# Patient Record
Sex: MALE | Race: OTHER | HISPANIC OR LATINO
[De-identification: names, ages, dates, MRNs, and addresses within clinical notes are randomized per-mention and may not be internally consistent; named-entity substitution may affect disease eponyms.]

---

## 2024-09-13 ENCOUNTER — NON-APPOINTMENT (OUTPATIENT)
Age: 44
End: 2024-09-13

## 2024-09-13 PROBLEM — Z00.00 ENCOUNTER FOR PREVENTIVE HEALTH EXAMINATION: Status: ACTIVE | Noted: 2024-09-13

## 2024-09-17 ENCOUNTER — NON-APPOINTMENT (OUTPATIENT)
Age: 44
End: 2024-09-17

## 2024-09-18 ENCOUNTER — LABORATORY RESULT (OUTPATIENT)
Age: 44
End: 2024-09-18

## 2024-09-18 ENCOUNTER — NON-APPOINTMENT (OUTPATIENT)
Age: 44
End: 2024-09-18

## 2024-09-18 ENCOUNTER — APPOINTMENT (OUTPATIENT)
Dept: PEDIATRIC ALLERGY IMMUNOLOGY | Facility: CLINIC | Age: 44
End: 2024-09-18

## 2024-09-19 LAB
25(OH)D3 SERPL-MCNC: 36.8 NG/ML
ALBUMIN SERPL ELPH-MCNC: 4.6 G/DL
ALP BLD-CCNC: 46 U/L
ALT SERPL-CCNC: 26 U/L
ANION GAP SERPL CALC-SCNC: 10 MMOL/L
AST SERPL-CCNC: 27 U/L
BASOPHILS # BLD AUTO: 0.02 K/UL
BASOPHILS NFR BLD AUTO: 0.4 %
BILIRUB SERPL-MCNC: 0.4 MG/DL
BUN SERPL-MCNC: 18 MG/DL
C TRACH RRNA SPEC QL NAA+PROBE: NOT DETECTED
CALCIUM SERPL-MCNC: 10 MG/DL
CD3 CELLS # BLD: 1422 CELLS/UL
CD3 CELLS NFR BLD: 79 %
CD3+CD4+ CELLS # BLD: 643 CELLS/UL
CD3+CD4+ CELLS NFR BLD: 36 %
CD3+CD4+ CELLS/CD3+CD8+ CLL SPEC: 0.95 RATIO
CD3+CD8+ CELLS # SPEC: 674 CELLS/UL
CD3+CD8+ CELLS NFR BLD: 38 %
CHLORIDE SERPL-SCNC: 104 MMOL/L
CHOLEST SERPL-MCNC: 151 MG/DL
CMV IGG SERPL QL: >10 U/ML
CMV IGG SERPL-IMP: POSITIVE
CO2 SERPL-SCNC: 24 MMOL/L
CREAT SERPL-MCNC: 1.29 MG/DL
EGFR: 70 ML/MIN/1.73M2
EOSINOPHIL # BLD AUTO: 0.15 K/UL
EOSINOPHIL NFR BLD AUTO: 3.2 %
ESTIMATED AVERAGE GLUCOSE: 114 MG/DL
GLUCOSE SERPL-MCNC: 81 MG/DL
HBA1C MFR BLD HPLC: 5.6 %
HCT VFR BLD CALC: 41 %
HDLC SERPL-MCNC: 63 MG/DL
HGB BLD-MCNC: 13.4 G/DL
HIV1 RNA # SERPL NAA+PROBE: NORMAL
HIV1 RNA # SERPL NAA+PROBE: NORMAL COPIES/ML
HSV 1+2 IGG SER IA-IMP: POSITIVE
HSV 1+2 IGG SER IA-IMP: POSITIVE
HSV1 IGG SER QL: 3.88 INDEX
HSV2 IGG SER QL: 13.7 INDEX
IMM GRANULOCYTES NFR BLD AUTO: 0.2 %
LDLC SERPL CALC-MCNC: 74 MG/DL
LPL SERPL-CCNC: 70 U/L
LYMPHOCYTES # BLD AUTO: 1.82 K/UL
LYMPHOCYTES NFR BLD AUTO: 38.8 %
MAN DIFF?: NORMAL
MCHC RBC-ENTMCNC: 30.8 PG
MCHC RBC-ENTMCNC: 32.7 GM/DL
MCV RBC AUTO: 94.3 FL
MEV IGG FLD QL IA: 255 AU/ML
MEV IGG+IGM SER-IMP: POSITIVE
MONOCYTES # BLD AUTO: 0.51 K/UL
MONOCYTES NFR BLD AUTO: 10.9 %
MUV AB SER-ACNC: POSITIVE
MUV IGG SER QL IA: 209 AU/ML
N GONORRHOEA RRNA SPEC QL NAA+PROBE: NOT DETECTED
NEUTROPHILS # BLD AUTO: 2.18 K/UL
NEUTROPHILS NFR BLD AUTO: 46.5 %
NONHDLC SERPL-MCNC: 87 MG/DL
PLATELET # BLD AUTO: 177 K/UL
POTASSIUM SERPL-SCNC: 4.3 MMOL/L
PROT SERPL-MCNC: 7.5 G/DL
RBC # BLD: 4.35 M/UL
RBC # FLD: 13.4 %
RUBV IGG FLD-ACNC: 9.23 INDEX
RUBV IGG SER-IMP: POSITIVE
SODIUM SERPL-SCNC: 139 MMOL/L
SOURCE AMPLIFICATION: NORMAL
SOURCE ANAL: NORMAL
SOURCE ORAL: NORMAL
T GONDII AB SER-IMP: NEGATIVE
T GONDII IGG SER QL: <3 IU/ML
TRIGL SERPL-MCNC: 64 MG/DL
VIRAL LOAD INTERP: NORMAL
VIRAL LOAD LOG: NORMAL LG COP/ML
VZV AB TITR SER: POSITIVE
VZV IGG SER IF-ACNC: 6.07 S/CO
WBC # FLD AUTO: 4.69 K/UL

## 2024-09-20 ENCOUNTER — OUTPATIENT (OUTPATIENT)
Dept: OUTPATIENT SERVICES | Facility: HOSPITAL | Age: 44
LOS: 1 days | End: 2024-09-20
Payer: COMMERCIAL

## 2024-09-20 ENCOUNTER — APPOINTMENT (OUTPATIENT)
Dept: PEDIATRIC ALLERGY IMMUNOLOGY | Facility: CLINIC | Age: 44
End: 2024-09-20
Payer: COMMERCIAL

## 2024-09-20 VITALS
HEIGHT: 71 IN | HEART RATE: 66 BPM | DIASTOLIC BLOOD PRESSURE: 78 MMHG | SYSTOLIC BLOOD PRESSURE: 113 MMHG | BODY MASS INDEX: 18.9 KG/M2 | WEIGHT: 135 LBS

## 2024-09-20 DIAGNOSIS — Z20.828 CONTACT WITH AND (SUSPECTED) EXPOSURE TO OTHER VIRAL COMMUNICABLE DISEASES: ICD-10-CM

## 2024-09-20 DIAGNOSIS — Z00.00 ENCOUNTER FOR GENERAL ADULT MEDICAL EXAMINATION WITHOUT ABNORMAL FINDINGS: ICD-10-CM

## 2024-09-20 DIAGNOSIS — Z20.2 CONTACT WITH AND (SUSPECTED) EXPOSURE TO INFECTIONS WITH A PREDOMINANTLY SEXUAL MODE OF TRANSMISSION: ICD-10-CM

## 2024-09-20 DIAGNOSIS — Z11.3 ENCOUNTER FOR SCREENING FOR INFECTIONS WITH A PREDOMINANTLY SEXUAL MODE OF TRANSMISSION: ICD-10-CM

## 2024-09-20 DIAGNOSIS — Z80.52 FAMILY HISTORY OF MALIGNANT NEOPLASM OF BLADDER: ICD-10-CM

## 2024-09-20 DIAGNOSIS — B20 HUMAN IMMUNODEFICIENCY VIRUS [HIV] DISEASE: ICD-10-CM

## 2024-09-20 DIAGNOSIS — B00.9 HERPESVIRAL INFECTION, UNSPECIFIED: ICD-10-CM

## 2024-09-20 DIAGNOSIS — Z78.9 OTHER SPECIFIED HEALTH STATUS: ICD-10-CM

## 2024-09-20 DIAGNOSIS — Z11.4 ENCOUNTER FOR SCREENING FOR HUMAN IMMUNODEFICIENCY VIRUS [HIV]: ICD-10-CM

## 2024-09-20 DIAGNOSIS — Z11.59 ENCOUNTER FOR SCREENING FOR OTHER VIRAL DISEASES: ICD-10-CM

## 2024-09-20 LAB
HBV CORE IGG+IGM SER QL: NONREACTIVE
HBV SURFACE AB SERPL IA-ACNC: 9.7 MIU/ML
HBV SURFACE AG SER QL: NONREACTIVE
HCV AB SER QL: NONREACTIVE
HCV S/CO RATIO: 0.13 S/CO
HEPATITIS A IGG ANTIBODY: REACTIVE
T PALLIDUM AB SER QL IA: POSITIVE

## 2024-09-20 PROCEDURE — 99205 OFFICE O/P NEW HI 60 MIN: CPT

## 2024-09-20 PROCEDURE — G0463: CPT

## 2024-09-20 RX ORDER — DOXYCYCLINE HYCLATE 100 MG/1
100 TABLET ORAL
Qty: 30 | Refills: 0 | Status: ACTIVE | COMMUNITY
Start: 2024-09-20 | End: 1900-01-01

## 2024-09-20 RX ORDER — DOLUTEGRAVIR SODIUM AND LAMIVUDINE 50; 300 MG/1; MG/1
50-300 TABLET, FILM COATED ORAL
Qty: 30 | Refills: 1 | Status: ACTIVE | COMMUNITY
Start: 2024-09-20 | End: 1900-01-01

## 2024-09-20 NOTE — HISTORY OF PRESENT ILLNESS
[Excellent] : Excellent [Percent Adherence: _____ %] : [unfilled]% adherence [No] : No [FreeTextEntry1] : PAOLA MAX is a 43 year old, male seen on 09/20/2024 for  HIV Initial Visit.   Adhrence: Takes Dovato nightly, reports maybe missing once a month.   * HIV Diagnosis date: 2017 or 2018 (unsure) believes it was from an ex boyfriend from [place]  * Previous negative HIV test: (unsure) * History of Opportunistic infections: was very sick at time of dx, lost a lot of weight and was hospitalized for dengue and found to have HIV at the time.  * Previous HIV Medical Provider: Followed every 6 months, last saw in 2/2024  * HIV Risk Factor: MSM  * Previous HIV antiretroviral medication regimens (dates/drugs) : efavirenz + lamivudine + med ( approx 2 yrs) then abacavir + med (1 year) then dolutegravir/ lamivudine ( 3 yrs - present)  * Previous CD4 count and viral load: (date/numbers): has been undetectable for years  * Lowest CD4 count, and highest viral load (date/numbers): never dx with AIDS, no OI medications.   Housing: Moved to the  5/23/24 from [place], Living with friends in [place], they do not know his status. keeps meds in vitamin bottle.   Occupation: Working in a restaurant   Latent TB: diagnosed but never returned for tx, reports CXR was negative   HIV risk assessment  Single, MSM. bottom. In the past 1 month 10 sexual partners and used condoms with 8 partners.  Last sexual was 2 days ago w/o condom.  Typically meets partner through apps. Typically waits until he is in a relationship to disclose his HIV status.  DoxyPEP: would like Rx today  Substance Use:  * Alcohol: only on special occasions * Tobacco: cigarettes/vape  - no   * Marijuana: no   * Alkyl nitrites (Poppers): no   * Gamma hydroxybutyrate (GHB): no  * Hallucinogens/Shrooms/LSD (Acid): no :   * Heroin/Cocaine: no  yes-quantify:   * IVDA: no  yes-quantify:    * Sexual history: see below for Sexual history section (SH) * Sex for drugs/money:   no  STI history and treatment: yes dx with syphilis in 2024 , RPR titer was 1:128 received one injection of Bicillin in [place]. Previous titer in November was negative. When came [place] the [place] in [place] was told he needed to take 1 month of Doxy and he also completed that course.  Meets men via : [employer]  Immunization History: believes he has vaccine record at home  * Influenza * COVID-19 :  4 vaccines  * Monkeypox/Small pox : does not think so  * Meningitis * HPV : had 2 doses, unsure about a third  * Tdap : believes he had in 10 yrs  * Pneumococcal  No signs/symptoms of acute HIV. No fever, myalgias, throat pain, meningismus.

## 2024-09-20 NOTE — SOCIAL HISTORY
[Sexually Active] : The patient is sexually active [Sometimes] : sometimes [Male ___] : [unfilled] male [Date of most recent sexual encounter ___] : ~His/Her~ most recent sexual encounter was [unfilled] [Partner Aware?] : Partner Aware? Yes [Partnership for Health – Safe Sex Evidence Based Intervention] : The Partnership for Health Safe Sex Evidence Based Intervention was applied [Positive Reinforcement of Safe Behavior Message] : and a positive reinforcement of safe behavior message was provided. [Monogamous] : is not monogamous [de-identified] : verse. bottom

## 2024-09-20 NOTE — DISCUSSION/SUMMARY
[VL Stable, no change needed] : VL Stable, no change needed [15 min] : 15 min [HIV Education] : HIV Education [Transmission] : transmission [ARV Therapy] : ARV therapy [Universal Precautions] : universal precautions [Treatment Education] : treatment education [Drug Interactions] : drug interactions [Immune System] : immune system [Treatment Adherence] : treatment adherence [Anticipatory Guidance] : anticipatory guidance [Prognosis] : prognosis [Sexuality/Safer Sex] : sexuality/safer sex [Partner Notification Info/Discussion] : partner notification info/discussion [HIV info] : HIV info [Condoms] : condoms [Risk Reduction] : risk reduction [PrEP/PEP] : PrEP/PEP [The Topics Listed Above] : the topics listed above [The patient was able to ask questions and explain these concepts in his/her own words] : the patient was able to ask questions and explain these concepts in his/her own words

## 2024-09-20 NOTE — PHYSICAL EXAM
[Alert] : alert [Well Nourished] : well nourished [Healthy Appearance] : healthy appearance [No Acute Distress] : no acute distress [Well Developed] : well developed [Normal Pupil & Iris Size/Symmetry] : normal pupil and iris size and symmetry [No Discharge] : no discharge [Sclera Not Icteric] : sclera not icteric [Normal Nasal Mucosa] : the nasal mucosa was normal [Normal Lips/Tongue] : the lips and tongue were normal [Normal Outer Ear/Nose] : the ears and nose were normal in appearance [No Thrush] : no thrush [Supple] : the neck was supple [Normal Rate and Effort] : normal respiratory rhythm and effort [No Crackles] : no crackles [No Retractions] : no retractions [Bilateral Audible Breath Sounds] : bilateral audible breath sounds [Normal Rate] : heart rate was normal  [Normal S1, S2] : normal S1 and S2 [No murmur] : no murmur [Regular Rhythm] : with a regular rhythm [Normal Cervical Lymph Nodes] : cervical [Skin Intact] : skin intact  [No Rash] : no rash [No Skin Lesions] : no skin lesions [No clubbing] : no clubbing [No Edema] : no edema [No Cyanosis] : no cyanosis [Normal Mood] : mood was normal [Normal Affect] : affect was normal [Alert, Awake, Oriented as Age-Appropriate] : alert, awake, oriented as age appropriate

## 2024-09-21 LAB — HIV1+2 AB SPEC QL IA.RAPID: REACTIVE

## 2024-09-23 LAB
M TB IFN-G BLD-IMP: NEGATIVE
QUANTIFERON TB PLUS MITOGEN MINUS NIL: >10 IU/ML
QUANTIFERON TB PLUS NIL: 0.03 IU/ML
QUANTIFERON TB PLUS TB1 MINUS NIL: 0.11 IU/ML
QUANTIFERON TB PLUS TB2 MINUS NIL: 0.13 IU/ML

## 2024-09-25 ENCOUNTER — NON-APPOINTMENT (OUTPATIENT)
Age: 44
End: 2024-09-25

## 2024-09-25 DIAGNOSIS — B20 HUMAN IMMUNODEFICIENCY VIRUS [HIV] DISEASE: ICD-10-CM

## 2024-11-20 ENCOUNTER — LABORATORY RESULT (OUTPATIENT)
Age: 44
End: 2024-11-20

## 2024-11-20 ENCOUNTER — OUTPATIENT (OUTPATIENT)
Dept: OUTPATIENT SERVICES | Facility: HOSPITAL | Age: 44
LOS: 1 days | End: 2024-11-20
Payer: COMMERCIAL

## 2024-11-20 ENCOUNTER — APPOINTMENT (OUTPATIENT)
Dept: PEDIATRIC ALLERGY IMMUNOLOGY | Facility: CLINIC | Age: 44
End: 2024-11-20
Payer: COMMERCIAL

## 2024-11-20 VITALS
SYSTOLIC BLOOD PRESSURE: 125 MMHG | HEIGHT: 71 IN | DIASTOLIC BLOOD PRESSURE: 72 MMHG | TEMPERATURE: 97.9 F | HEART RATE: 70 BPM | WEIGHT: 135 LBS | BODY MASS INDEX: 18.9 KG/M2 | OXYGEN SATURATION: 99 %

## 2024-11-20 DIAGNOSIS — Z23 ENCOUNTER FOR IMMUNIZATION: ICD-10-CM

## 2024-11-20 DIAGNOSIS — Z00.00 ENCOUNTER FOR GENERAL ADULT MEDICAL EXAMINATION W/OUT ABNORMAL FINDINGS: ICD-10-CM

## 2024-11-20 DIAGNOSIS — Z00.00 ENCOUNTER FOR GENERAL ADULT MEDICAL EXAMINATION WITHOUT ABNORMAL FINDINGS: ICD-10-CM

## 2024-11-20 DIAGNOSIS — Z20.2 CONTACT WITH AND (SUSPECTED) EXPOSURE TO INFECTIONS WITH A PREDOMINANTLY SEXUAL MODE OF TRANSMISSION: ICD-10-CM

## 2024-11-20 DIAGNOSIS — B20 HUMAN IMMUNODEFICIENCY VIRUS [HIV] DISEASE: ICD-10-CM

## 2024-11-20 PROCEDURE — 86360 T CELL ABSOLUTE COUNT/RATIO: CPT

## 2024-11-20 PROCEDURE — 99205 OFFICE O/P NEW HI 60 MIN: CPT

## 2024-11-20 PROCEDURE — 80053 COMPREHEN METABOLIC PANEL: CPT

## 2024-11-20 PROCEDURE — 86359 T CELLS TOTAL COUNT: CPT

## 2024-11-20 PROCEDURE — 87086 URINE CULTURE/COLONY COUNT: CPT

## 2024-11-20 PROCEDURE — 85025 COMPLETE CBC W/AUTO DIFF WBC: CPT

## 2024-11-20 PROCEDURE — G0463: CPT

## 2024-11-20 PROCEDURE — 90472 IMMUNIZATION ADMIN EACH ADD: CPT

## 2024-11-20 PROCEDURE — 90686 IIV4 VACC NO PRSV 0.5 ML IM: CPT

## 2024-11-20 PROCEDURE — 81001 URINALYSIS AUTO W/SCOPE: CPT

## 2024-11-20 PROCEDURE — 90471 IMMUNIZATION ADMIN: CPT

## 2024-11-20 PROCEDURE — 87491 CHLMYD TRACH DNA AMP PROBE: CPT

## 2024-11-20 PROCEDURE — 86593 SYPHILIS TEST NON-TREP QUANT: CPT

## 2024-11-20 PROCEDURE — 87591 N.GONORRHOEAE DNA AMP PROB: CPT

## 2024-11-20 PROCEDURE — 87536 HIV-1 QUANT&REVRSE TRNSCRPJ: CPT

## 2024-11-20 PROCEDURE — 90651 9VHPV VACCINE 2/3 DOSE IM: CPT

## 2024-11-20 PROCEDURE — 83690 ASSAY OF LIPASE: CPT

## 2024-11-21 LAB
ALBUMIN SERPL ELPH-MCNC: 4.7 G/DL
ALP BLD-CCNC: 50 U/L
ALT SERPL-CCNC: 21 U/L
ANION GAP SERPL CALC-SCNC: 11 MMOL/L
APPEARANCE: CLEAR
AST SERPL-CCNC: 27 U/L
BASOPHILS # BLD AUTO: 0.04 K/UL
BASOPHILS NFR BLD AUTO: 0.7 %
BILIRUB SERPL-MCNC: 0.4 MG/DL
BILIRUBIN URINE: NEGATIVE
BLOOD URINE: NEGATIVE
BUN SERPL-MCNC: 18 MG/DL
C TRACH RRNA SPEC QL NAA+PROBE: NOT DETECTED
CALCIUM SERPL-MCNC: 9.9 MG/DL
CD3 CELLS # BLD: 1836 CELLS/UL
CD3 CELLS NFR BLD: 77 %
CD3+CD4+ CELLS # BLD: 876 CELLS/UL
CD3+CD4+ CELLS NFR BLD: 37 %
CD3+CD4+ CELLS/CD3+CD8+ CLL SPEC: 1.05 RATIO
CD3+CD8+ CELLS # SPEC: 833 CELLS/UL
CD3+CD8+ CELLS NFR BLD: 35 %
CHLORIDE SERPL-SCNC: 105 MMOL/L
CO2 SERPL-SCNC: 26 MMOL/L
COLOR: YELLOW
CREAT SERPL-MCNC: 1.34 MG/DL
EGFR: 66 ML/MIN/1.73M2
EOSINOPHIL # BLD AUTO: 0.18 K/UL
EOSINOPHIL NFR BLD AUTO: 3.3 %
GLUCOSE QUALITATIVE U: NEGATIVE MG/DL
GLUCOSE SERPL-MCNC: 93 MG/DL
HCT VFR BLD CALC: 44.3 %
HGB BLD-MCNC: 13.9 G/DL
HIV1 RNA # SERPL NAA+PROBE: NORMAL
HIV1 RNA # SERPL NAA+PROBE: NORMAL COPIES/ML
IMM GRANULOCYTES NFR BLD AUTO: 0.2 %
KETONES URINE: NEGATIVE MG/DL
LEUKOCYTE ESTERASE URINE: ABNORMAL
LPL SERPL-CCNC: 57 U/L
LYMPHOCYTES # BLD AUTO: 2.24 K/UL
LYMPHOCYTES NFR BLD AUTO: 41.1 %
MAN DIFF?: NORMAL
MCHC RBC-ENTMCNC: 31.2 PG
MCHC RBC-ENTMCNC: 31.4 G/DL
MCV RBC AUTO: 99.6 FL
MONOCYTES # BLD AUTO: 0.47 K/UL
MONOCYTES NFR BLD AUTO: 8.6 %
N GONORRHOEA RRNA SPEC QL NAA+PROBE: NOT DETECTED
NEUTROPHILS # BLD AUTO: 2.51 K/UL
NEUTROPHILS NFR BLD AUTO: 46.1 %
NITRITE URINE: NEGATIVE
PH URINE: 5.5
PLATELET # BLD AUTO: 198 K/UL
POTASSIUM SERPL-SCNC: 4.7 MMOL/L
PROT SERPL-MCNC: 7.6 G/DL
PROTEIN URINE: NORMAL MG/DL
RBC # BLD: 4.45 M/UL
RBC # FLD: 13.2 %
SODIUM SERPL-SCNC: 141 MMOL/L
SOURCE AMPLIFICATION: NORMAL
SOURCE ANAL: NORMAL
SOURCE ORAL: NORMAL
SPECIFIC GRAVITY URINE: 1.03
UROBILINOGEN URINE: 0.2 MG/DL
VIRAL LOAD INTERP: NORMAL
VIRAL LOAD LOG: NORMAL LG COP/ML
WBC # FLD AUTO: 5.45 K/UL

## 2024-11-22 DIAGNOSIS — B20 HUMAN IMMUNODEFICIENCY VIRUS [HIV] DISEASE: ICD-10-CM

## 2024-11-22 LAB — RPR SER-TITR: ABNORMAL

## 2024-12-11 ENCOUNTER — NON-APPOINTMENT (OUTPATIENT)
Age: 44
End: 2024-12-11